# Patient Record
(demographics unavailable — no encounter records)

---

## 2025-02-15 NOTE — REVIEW OF SYSTEMS
[Postnasal Drip] : postnasal drip [Nasal Discharge] : nasal discharge [Shortness Of Breath] : no shortness of breath [Wheezing] : wheezing [Cough] : cough [Dyspnea on Exertion] : not dyspnea on exertion [Muscle Pain] : muscle pain [Back Pain] : back pain [Negative] : Genitourinary

## 2025-02-15 NOTE — HISTORY OF PRESENT ILLNESS
[[email or web address]] : 33-year-old male presenting with worsening upper respiratory symptoms since Monday, including post-nasal drip, congestion, sore throat, cough with mucus production, body aches, and recent onset of dizziness. No fever reported. He has been using Sudafed for symptom relief with diminishing effectiveness. Patient denies blurry vision but reports episodes of dizziness today. Chest pain was noted this morning along with body aches. Past Medical History: Congenital solitary right kidney. Past Surgical History: Ear tube placement as a child. Maternal grandmother: Diabetes (onset in 80s), breast cancer, stomach cancer; Maternal aunt: Stomach cancer, rare appendix cancer; Paternal and maternal grandfathers: Strokes. Social History: Former smoker, quit 1 year ago; Alcohol use: 3-4 beers once a week; Caffeine: 1-2 cups per day; Sexually active with wife.

## 2025-02-15 NOTE — PHYSICAL EXAM
[Normal] : no joint swelling and grossly normal strength and tone [de-identified] : minimal wheeze